# Patient Record
Sex: FEMALE | Race: WHITE | NOT HISPANIC OR LATINO | Employment: STUDENT | URBAN - NONMETROPOLITAN AREA
[De-identification: names, ages, dates, MRNs, and addresses within clinical notes are randomized per-mention and may not be internally consistent; named-entity substitution may affect disease eponyms.]

---
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
## 2025-01-15 ENCOUNTER — OFFICE VISIT (OUTPATIENT)
Dept: FAMILY MEDICINE CLINIC | Age: 20
End: 2025-01-15
Payer: COMMERCIAL

## 2025-01-15 ENCOUNTER — LAB (OUTPATIENT)
Dept: LAB | Facility: HOSPITAL | Age: 20
End: 2025-01-15
Payer: COMMERCIAL

## 2025-01-15 VITALS
DIASTOLIC BLOOD PRESSURE: 65 MMHG | OXYGEN SATURATION: 100 % | WEIGHT: 131 LBS | BODY MASS INDEX: 20.56 KG/M2 | SYSTOLIC BLOOD PRESSURE: 107 MMHG | HEIGHT: 67 IN | TEMPERATURE: 98.4 F | HEART RATE: 90 BPM

## 2025-01-15 DIAGNOSIS — Z13.1 SCREENING FOR DIABETES MELLITUS: ICD-10-CM

## 2025-01-15 DIAGNOSIS — E55.9 VITAMIN D DEFICIENCY: ICD-10-CM

## 2025-01-15 DIAGNOSIS — L70.0 ACNE VULGARIS: ICD-10-CM

## 2025-01-15 DIAGNOSIS — N76.0 ACUTE VAGINITIS: Primary | ICD-10-CM

## 2025-01-15 DIAGNOSIS — R82.998 LEUKOCYTES IN URINE: ICD-10-CM

## 2025-01-15 DIAGNOSIS — E55.9 VITAMIN D DEFICIENCY: Primary | ICD-10-CM

## 2025-01-15 LAB
25(OH)D3 SERPL-MCNC: 13.6 NG/ML (ref 30–100)
ALBUMIN SERPL-MCNC: 4.4 G/DL (ref 3.5–5.2)
ALBUMIN/GLOB SERPL: 1.7 G/DL
ALP SERPL-CCNC: 79 U/L (ref 39–117)
ALT SERPL W P-5'-P-CCNC: 13 U/L (ref 1–33)
ANION GAP SERPL CALCULATED.3IONS-SCNC: 8 MMOL/L (ref 5–15)
AST SERPL-CCNC: 16 U/L (ref 1–32)
BILIRUB BLD-MCNC: NEGATIVE MG/DL
BILIRUB SERPL-MCNC: 0.6 MG/DL (ref 0–1.2)
BUN SERPL-MCNC: 4 MG/DL (ref 6–20)
BUN/CREAT SERPL: 5.7 (ref 7–25)
CALCIUM SPEC-SCNC: 9.3 MG/DL (ref 8.6–10.5)
CANDIDA SPECIES: NEGATIVE
CHLORIDE SERPL-SCNC: 103 MMOL/L (ref 98–107)
CLARITY, POC: CLEAR
CO2 SERPL-SCNC: 28 MMOL/L (ref 22–29)
COLOR UR: YELLOW
CREAT SERPL-MCNC: 0.7 MG/DL (ref 0.57–1)
EGFRCR SERPLBLD CKD-EPI 2021: 128 ML/MIN/1.73
EXPIRATION DATE: ABNORMAL
GARDNERELLA VAGINALIS: NEGATIVE
GLOBULIN UR ELPH-MCNC: 2.6 GM/DL
GLUCOSE SERPL-MCNC: 79 MG/DL (ref 65–99)
GLUCOSE UR STRIP-MCNC: NEGATIVE MG/DL
KETONES UR QL: NEGATIVE
LEUKOCYTE EST, POC: ABNORMAL
Lab: ABNORMAL
NITRITE UR-MCNC: NEGATIVE MG/ML
PH UR: 6 [PH] (ref 5–8)
POTASSIUM SERPL-SCNC: 4.4 MMOL/L (ref 3.5–5.2)
PROT SERPL-MCNC: 7 G/DL (ref 6–8.5)
PROT UR STRIP-MCNC: NEGATIVE MG/DL
RBC # UR STRIP: NEGATIVE /UL
SODIUM SERPL-SCNC: 139 MMOL/L (ref 136–145)
SP GR UR: 1.02 (ref 1–1.03)
T VAGINALIS DNA VAG QL PROBE+SIG AMP: NEGATIVE
UROBILINOGEN UR QL: NORMAL

## 2025-01-15 PROCEDURE — 87086 URINE CULTURE/COLONY COUNT: CPT | Performed by: NURSE PRACTITIONER

## 2025-01-15 PROCEDURE — 87480 CANDIDA DNA DIR PROBE: CPT | Performed by: NURSE PRACTITIONER

## 2025-01-15 PROCEDURE — 80053 COMPREHEN METABOLIC PANEL: CPT

## 2025-01-15 PROCEDURE — 82306 VITAMIN D 25 HYDROXY: CPT

## 2025-01-15 PROCEDURE — 87510 GARDNER VAG DNA DIR PROBE: CPT | Performed by: NURSE PRACTITIONER

## 2025-01-15 PROCEDURE — 87660 TRICHOMONAS VAGIN DIR PROBE: CPT | Performed by: NURSE PRACTITIONER

## 2025-01-15 PROCEDURE — 1159F MED LIST DOCD IN RCRD: CPT | Performed by: NURSE PRACTITIONER

## 2025-01-15 PROCEDURE — 1160F RVW MEDS BY RX/DR IN RCRD: CPT | Performed by: NURSE PRACTITIONER

## 2025-01-15 PROCEDURE — 99214 OFFICE O/P EST MOD 30 MIN: CPT | Performed by: NURSE PRACTITIONER

## 2025-01-15 PROCEDURE — 36415 COLL VENOUS BLD VENIPUNCTURE: CPT

## 2025-01-15 RX ORDER — ACETAMINOPHEN 160 MG
2000 TABLET,DISINTEGRATING ORAL DAILY
Qty: 30 CAPSULE | Refills: 11 | Status: SHIPPED | OUTPATIENT
Start: 2025-01-15 | End: 2025-01-15

## 2025-01-15 RX ORDER — ACETAMINOPHEN 160 MG
2000 TABLET,DISINTEGRATING ORAL DAILY
Qty: 30 CAPSULE | Refills: 11 | Status: SHIPPED | OUTPATIENT
Start: 2025-01-15 | End: 2026-01-15

## 2025-01-15 RX ORDER — CLINDAMYCIN PHOSPHATE 11.9 MG/ML
SOLUTION TOPICAL 2 TIMES DAILY
Qty: 30 ML | Refills: 1 | Status: SHIPPED | OUTPATIENT
Start: 2025-01-15

## 2025-01-15 NOTE — PROGRESS NOTES
"Chief Complaint  Gynecologic Exam    Subjective          Emelia Brooks presents to [facility]     Patient is a 19-year-old female who is here today with her aunt.  Reports a clear vaginal discharge for the last 1 to 2 months with an occasional odor.  Denies itching or burning.  Has never had a Pap smear.  Is sexually active.  Does not want to take birth control pills but uses condoms.  Denies fever, nausea, vomiting, or pelvic pain.  Last menstrual cycle December 28.    Regarding history of vitamin D deficiency, vitamin D level was 11.9 in May.  Stopped taking vitamin D supplement because of concern that could be exacerbating her acne.  She would like a referral to a dermatologist for further treatment of acne.     Objective   Vital Signs:   Vitals:    01/15/25 0810   BP: 107/65   BP Location: Right arm   Patient Position: Sitting   Cuff Size: Adult   Pulse: 90   Temp: 98.4 °F (36.9 °C)   TempSrc: Oral   SpO2: 100%   Weight: 59.4 kg (131 lb)   Height: 170 cm (66.93\")       Wt Readings from Last 3 Encounters:   01/15/25 59.4 kg (131 lb) (57%, Z= 0.17)*   10/16/24 59.5 kg (131 lb 3.2 oz) (58%, Z= 0.21)*   06/24/24 58.5 kg (129 lb) (56%, Z= 0.15)*     * Growth percentiles are based on CDC (Girls, 2-20 Years) data.      BP Readings from Last 3 Encounters:   01/15/25 107/65   10/16/24 94/58   06/24/24 131/83       Body mass index is 20.56 kg/m².    Pediatric BMI = 36 %ile (Z= -0.36) based on CDC (Girls, 2-20 Years) BMI-for-age based on BMI available on 1/15/2025.. BMI is within normal parameters. No other follow-up for BMI required.       Physical Exam  Vitals reviewed.   Constitutional:       General: She is not in acute distress.     Appearance: Normal appearance. She is well-developed.   Cardiovascular:      Rate and Rhythm: Normal rate and regular rhythm.      Heart sounds: Normal heart sounds.   Pulmonary:      Effort: Pulmonary effort is normal.      Breath sounds: Normal breath " sounds.   Genitourinary:     General: Normal vulva.      Vagina: Normal.   Musculoskeletal:      Right lower leg: No edema.      Left lower leg: No edema.   Skin:     General: Skin is warm and dry.   Neurological:      General: No focal deficit present.      Mental Status: She is alert.   Psychiatric:         Attention and Perception: Attention normal.         Mood and Affect: Mood and affect normal.         Behavior: Behavior normal.           Current Outpatient Medications:     albuterol (PROVENTIL) (2.5 MG/3ML) 0.083% nebulizer solution, Take 2.5 mg by nebulization Every 4 (Four) Hours As Needed for Wheezing., Disp: 15 mL, Rfl: 12    albuterol sulfate  (90 Base) MCG/ACT inhaler, Inhale 2 puffs Every 4 (Four) Hours As Needed for Wheezing., Disp: , Rfl:     budesonide-formoterol (Symbicort) 160-4.5 MCG/ACT inhaler, Inhale 2 puffs 2 (Two) Times a Day., Disp: 10.2 g, Rfl: 11    fluticasone (FLONASE) 50 MCG/ACT nasal spray, 1 spray by Each Nare route Daily., Disp: , Rfl:     clindamycin (CLEOCIN T) 1 % external solution, Apply  topically to the appropriate area as directed 2 (Two) Times a Day., Disp: 30 mL, Rfl: 1   Past Medical History:   Diagnosis Date    Bronchitis     Gilbert syndrome 06/13/2024     No Known Allergies            Result Review :     Common labs          5/8/2024    15:27 6/11/2024    10:58 10/16/2024    14:18   Common Labs   Glucose 89   91    BUN 4   4    Creatinine 0.75   0.85    Sodium 138   141    Potassium 4.2   4.5    Chloride 102   107    Calcium 9.5   9.2    Albumin 4.8  4.8  4.3    Total Bilirubin 1.8  1.6  1.1    Alkaline Phosphatase 70  72  78    AST (SGOT) 86  14  19    ALT (SGPT) 45  10  10    WBC 6.03   7.26    Hemoglobin 13.9   14.7    Hematocrit 43.5   45.0    Platelets 322   296         No Images in the past 120 days found..           Social History     Tobacco Use   Smoking Status Never   Smokeless Tobacco Never           Assessment and Plan    Diagnoses and all orders for  this visit:    1. Acute vaginitis (Primary)  -     POCT urinalysis dipstick, automated  -     Gardnerella vaginalis, Trichomonas vaginalis, Candida albicans, DNA - Swab, Vagina; Future  -     Gardnerella vaginalis, Trichomonas vaginalis, Candida albicans, DNA - Swab, Vagina    2. Leukocytes in urine  -     Cancel: Urine Culture - Urine, Urine, Clean Catch; Future  -     Urine Culture - Urine, Urine, Clean Catch    3. Acne vulgaris  -     clindamycin (CLEOCIN T) 1 % external solution; Apply  topically to the appropriate area as directed 2 (Two) Times a Day.  Dispense: 30 mL; Refill: 1  -     Ambulatory Referral to Dermatology    4. Vitamin D deficiency  -     Vitamin D 25 hydroxy; Future    5. Screening for diabetes mellitus  -     Comprehensive metabolic panel; Future        Follow Up    No follow-ups on file.  Patient was given instructions and counseling regarding her condition or for health maintenance advice. Please see specific information pulled into the AVS if appropriate.

## 2025-01-15 NOTE — ASSESSMENT & PLAN NOTE
Not likely a bladder infection but urine culture is pending.  Vaginal screen is also obtained.  Further treatment recommendations pending lab results.  If both of these tests are negative I would then suggest a urine for gonorrhea chlamydia.  Follow-up if symptoms do not resolve.  Start getting Pap smears at age 21 or sooner if concerns.

## 2025-01-16 LAB — BACTERIA SPEC AEROBE CULT: NO GROWTH

## 2025-01-16 NOTE — PROGRESS NOTES
Blood sugar, kidney and liver function are normal.  Vitamin d level is low.  I do recommend trying a different vitamin d supplement which is being sent to pharmacy.  Recheck vitamin d level in 3 months .

## 2025-01-19 DIAGNOSIS — N76.0 ACUTE VAGINITIS: Primary | ICD-10-CM

## 2025-02-05 ENCOUNTER — TELEPHONE (OUTPATIENT)
Dept: FAMILY MEDICINE CLINIC | Age: 20
End: 2025-02-05
Payer: COMMERCIAL

## 2025-02-05 NOTE — TELEPHONE ENCOUNTER
----- Message from Ariana WALL sent at 2/3/2025  1:09 PM EST -----      ----- Message -----  From: SYSTEM  Sent: 2/3/2025   1:22 AM EST  To: [facility]

## 2025-02-06 ENCOUNTER — LAB (OUTPATIENT)
Dept: LAB | Facility: HOSPITAL | Age: 20
End: 2025-02-06
Payer: COMMERCIAL

## 2025-02-06 DIAGNOSIS — N76.0 ACUTE VAGINITIS: ICD-10-CM

## 2025-02-06 LAB
C TRACH RRNA CVX QL NAA+PROBE: NOT DETECTED
N GONORRHOEA RRNA SPEC QL NAA+PROBE: NOT DETECTED

## 2025-02-06 PROCEDURE — 87591 N.GONORRHOEAE DNA AMP PROB: CPT

## 2025-02-06 PROCEDURE — 87491 CHLMYD TRACH DNA AMP PROBE: CPT

## 2025-04-09 ENCOUNTER — LAB (OUTPATIENT)
Dept: LAB | Facility: HOSPITAL | Age: 20
End: 2025-04-09
Payer: COMMERCIAL

## 2025-04-09 DIAGNOSIS — E55.9 VITAMIN D DEFICIENCY: ICD-10-CM

## 2025-04-09 LAB — 25(OH)D3 SERPL-MCNC: 21.1 NG/ML (ref 30–100)

## 2025-04-09 PROCEDURE — 36415 COLL VENOUS BLD VENIPUNCTURE: CPT

## 2025-04-09 PROCEDURE — 82306 VITAMIN D 25 HYDROXY: CPT

## 2025-04-22 ENCOUNTER — OFFICE VISIT (OUTPATIENT)
Dept: PULMONOLOGY | Facility: CLINIC | Age: 20
End: 2025-04-22
Payer: COMMERCIAL

## 2025-04-22 VITALS
SYSTOLIC BLOOD PRESSURE: 116 MMHG | HEIGHT: 67 IN | DIASTOLIC BLOOD PRESSURE: 79 MMHG | RESPIRATION RATE: 14 BRPM | WEIGHT: 143.08 LBS | BODY MASS INDEX: 22.46 KG/M2 | OXYGEN SATURATION: 98 % | TEMPERATURE: 98.4 F | HEART RATE: 104 BPM

## 2025-04-22 DIAGNOSIS — J45.41 MODERATE PERSISTENT ASTHMA WITH EXACERBATION: ICD-10-CM

## 2025-04-22 DIAGNOSIS — R06.02 SHORTNESS OF BREATH: ICD-10-CM

## 2025-04-22 DIAGNOSIS — F17.290 VAPING NICOTINE DEPENDENCE, TOBACCO PRODUCT: Primary | ICD-10-CM

## 2025-04-22 PROCEDURE — 1159F MED LIST DOCD IN RCRD: CPT | Performed by: INTERNAL MEDICINE

## 2025-04-22 PROCEDURE — 1160F RVW MEDS BY RX/DR IN RCRD: CPT | Performed by: INTERNAL MEDICINE

## 2025-04-22 PROCEDURE — 99214 OFFICE O/P EST MOD 30 MIN: CPT | Performed by: INTERNAL MEDICINE

## 2025-04-22 NOTE — PROGRESS NOTES
Primary Care Provider  Danette Regan APRN     Referring Provider  No ref. provider found     Chief Complaint  Follow-up (10 month) and Cough    Subjective          Emelia Brooks presents to [facility] PULMONARY & CRITICAL CARE MEDICINE  History of Present Illness  Emelia Brooks is a 19 y.o. female patient   History of Present Illness  The patient is a 19-year-old female with a history of spontaneous pneumothorax, pneumomediastinum, and asthma, here for a follow-up visit.    She reports an improvement in her condition. She continues to vape at the same frequency as before but does not use any other tobacco products. She has discontinued the use of Singulair and another unidentified medication, as well as her inhalers, due to perceived worsening of symptoms. She experiences chest pain during prolonged walking or running. She maintains oral hygiene by rinsing her mouth after using Symbicort to prevent thrush. She has recently started taking zinc supplements to boost her immune system. She occasionally experiences acid reflux and postnasal drip. During episodes of pneumonia and asthma, she has experienced significant coughing to expel phlegm. She has not required the use of rescue inhalers for several months. She is not currently using any inhalers but has Symbicort available for use as needed. She was previously on a regimen of Symbicort, two puffs twice daily, but discontinued it due to feeling worse.    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 She resides with her father, who smokes, and they have dogs at home. She is uncertain about the date of her last influenza vaccination.    SOCIAL HISTORY  She admits to vaping but does not smoke cigarettes. 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 She resides with her father, who smokes, and they have dogs at home.    FAMILY HISTORY  She reports no family history of  asthma.    MEDICATIONS  Discontinued: Singulair    Review of Systems     General:  No Fatigue, No Fever No weight loss or loss of appetite  HEENT: No dysphagia, No Visual Changes, no rhinorrhea  Respiratory:  + cough,+Dyspnea, intermittent phlegm, No Pleuritic Pain, no wheezing, no hemoptysis.  Cardiovascular: Denies chest pain, denies palpitations,+STUART, No Chest Pressure  Gastrointestinal:  No Abdominal Pain, No Nausea, No Vomiting, No Diarrhea  Genitourinary:  No Dysuria, No Frequency, No Hesitancy  Musculoskeletal: No muscle pain or swelling  Endocrine:  No Heat Intolerance, No Cold Intolerance  Hematologic:  No Bleeding, No Bruising  Psychiatric:  No Anxiety, No Depression  Neurologic:  No Confusion, no Dysarthria, No Headaches  Skin:  No Rash, No Open Wounds    Family History   Problem Relation Age of Onset    Heart attack Father     Hypertension Father     Liver disease Brother     Hepatitis Brother         autoimmune cause        Social History     Socioeconomic History    Marital status: Single   Tobacco Use    Smoking status: Never    Smokeless tobacco: Never   Vaping Use    Vaping status: Every Day    Substances: Nicotine, Flavoring    Devices: Disposable   Substance and Sexual Activity    Alcohol use: Never    Drug use: Not Currently     Types: Marijuana    Sexual activity: Defer        Past Medical History:   Diagnosis Date    Bronchitis     Gilbert syndrome 06/13/2024        Immunization History   Administered Date(s) Administered    DTaP, Unspecified 2005, 01/13/2006, 03/06/2006, 04/14/2008, 12/14/2010    Flu Vaccine Split Quad 11/08/2019    Fluzone (or Fluarix & Flulaval for VFC) >6mos 11/08/2019    Hep A, 2 Dose 04/14/2008, 12/14/2010    Hep B, Adolescent or Pediatric 2005, 2005, 06/26/2006    HiB 2005, 01/13/2006, 03/06/2006    IPV 2005, 01/13/2006, 04/14/2008, 12/14/2010    MMR 09/18/2006    MMRV 12/14/2010    Meningococcal Conjugate 11/12/2021    Meningococcal MCV4P  "(Menactra) 12/06/2016    PEDS-Pneumococcal Conjugate (PCV7) 2005, 01/13/2006, 03/06/2006, 09/18/2006    Tdap 12/06/2016    Varicella 09/18/2006         No Known Allergies       Current Outpatient Medications:     clindamycin (CLEOCIN T) 1 % external solution, Apply  topically to the appropriate area as directed 2 (Two) Times a Day., Disp: 30 mL, Rfl: 1    albuterol (PROVENTIL) (2.5 MG/3ML) 0.083% nebulizer solution, Take 2.5 mg by nebulization Every 4 (Four) Hours As Needed for Wheezing. (Patient not taking: Reported on 4/22/2025), Disp: 15 mL, Rfl: 12    albuterol sulfate  (90 Base) MCG/ACT inhaler, Inhale 2 puffs Every 4 (Four) Hours As Needed for Wheezing. (Patient not taking: Reported on 4/22/2025), Disp: , Rfl:     budesonide-formoterol (Symbicort) 160-4.5 MCG/ACT inhaler, Inhale 2 puffs 2 (Two) Times a Day. (Patient not taking: Reported on 4/22/2025), Disp: 10.2 g, Rfl: 11    Cholecalciferol (Vitamin D3) 50 MCG (2000 UT) capsule, Take 1 capsule by mouth Daily. (Patient not taking: Reported on 4/22/2025), Disp: 30 capsule, Rfl: 11    fluticasone (FLONASE) 50 MCG/ACT nasal spray, 1 spray by Each Nare route Daily. (Patient not taking: Reported on 4/22/2025), Disp: , Rfl:      Objective   Physical Exam  Physical Exam  Lungs are clear.    Vital Signs:   /79 (BP Location: Right arm, Patient Position: Sitting, Cuff Size: Adult)   Pulse 104   Temp 98.4 °F (36.9 °C) (Tympanic)   Resp 14   Ht 170 cm (66.93\")   Wt 64.9 kg (143 lb 1.3 oz)   SpO2 98% Comment: room air  BMI 22.46 kg/m²       Vital Signs Reviewed  WDWN, Alert, NAD.   HEENT:  PERRL, EOMI.  OP, nares clear, no sinus tenderness  Mallampatti classification : 1  Neck:  Supple, no JVD, no thyromegaly  Lymph: no axillary, cervical, supraclavicular lymphadenopathy noted bilaterally  Chest:  good aeration, bilateral diminished breath sounds, no wheezing, crackles or rhonchi, resonant to percussion b/l  CV: RRR, no MGR, pulses 2+, " equal.  Abd:  Soft, NT, ND, + BS, no HSM  EXT:  no clubbing, no cyanosis, No BLE edema  Neuro:  A&Ox3, CN grossly intact, no focal deficits.  Skin: No rashes or lesions noted     Result Review :   The following data was reviewed by: Scott Tan MD on 04/22/2025:  Common labs          6/11/2024    10:58 10/16/2024    14:18 1/15/2025    08:49   Common Labs   Glucose  91  79    BUN  4  4    Creatinine  0.85  0.70    Sodium  141  139    Potassium  4.5  4.4    Chloride  107  103    Calcium  9.2  9.3    Albumin 4.8  4.3  4.4    Total Bilirubin 1.6  1.1  0.6    Alkaline Phosphatase 72  78  79    AST (SGOT) 14  19  16    ALT (SGPT) 10  10  13    WBC  7.26     Hemoglobin  14.7     Hematocrit  45.0     Platelets  296       CMP          6/11/2024    10:58 10/16/2024    14:18 1/15/2025    08:49   CMP   Glucose  91  79    BUN  4  4    Creatinine  0.85  0.70    EGFR  101.4  128.0    Sodium  141  139    Potassium  4.5  4.4    Chloride  107  103    Calcium  9.2  9.3    Total Protein 7.3  7.1  7.0    Albumin 4.8  4.3  4.4    Globulin  2.8  2.6    Total Bilirubin 1.6  1.1  0.6    Alkaline Phosphatase 72  78  79    AST (SGOT) 14  19  16    ALT (SGPT) 10  10  13    Albumin/Globulin Ratio  1.5  1.7    BUN/Creatinine Ratio  4.7  5.7    Anion Gap  11.4  8.0      CBC          5/8/2024    15:27 10/16/2024    14:18   CBC   WBC 6.03  7.26    RBC 4.57  4.82    Hemoglobin 13.9  14.7    Hematocrit 43.5  45.0    MCV 95.2  93.4    MCH 30.4  30.5    MCHC 32.0  32.7    RDW 11.7  11.8    Platelets 322  296      CBC w/diff          5/8/2024    15:27 10/16/2024    14:18   CBC w/Diff   WBC 6.03  7.26    RBC 4.57  4.82    Hemoglobin 13.9  14.7    Hematocrit 43.5  45.0    MCV 95.2  93.4    MCH 30.4  30.5    MCHC 32.0  32.7    RDW 11.7  11.8    Platelets 322  296    Neutrophil Rel % 56.5  60.9    Immature Granulocyte Rel % 0.2  0.1    Lymphocyte Rel % 33.7  26.0    Monocyte Rel % 7.1  7.9    Eosinophil Rel % 2.2  4.8    Basophil Rel % 0.3  0.3       Data reviewed : Radiologic studies previous CT chest from 2024 reviewed.     Results  Imaging  CAT scan from last year was normal.    Testing  Methacholine challenge test conducted 2 years ago confirmed the presence of asthma.        Narrative & Impression   CT CHEST WO CONTRAST DIAGNOSTIC-     Date of Exam: 4/30/2024 9:56 AM     Indication: pneumomedistinum; J98.2-Interstitial emphysema;  J45.41-Moderate persistent asthma with (acute) exacerbation;  F17.290-Nicotine dependence, other tobacco product, uncomplicated.     Comparison: 11/28/2023     Technique: Axial CT images were obtained of the chest without contrast  administration.  Reconstructed coronal and sagittal images were also  obtained. Automated exposure control and iterative construction methods  were used.        Findings:  Torrie/mediastinum: No pneumomediastinum. No abnormal mediastinal fluid  collection. Normal-appearing remnant thymic tissue in the anterior  mediastinum. No pericardial effusion. No coronary calcification     Lungs/pleura: Lungs clear. Interval resolution of previously  demonstrated groundglass infiltrates. Pleural spaces clear     Upper abdomen: Unremarkable     Bones/soft tissues: No bony abnormality     IMPRESSION:  Impression:  Unremarkable exam. Interval resolution of pneumomediastinum and  pulmonary groundglass infiltrates           Electronically Signed By-Jai Workman On:4/30/2024 11:43 AM           Assessment and Plan    Diagnoses and all orders for this visit:    1. Vaping nicotine dependence, tobacco product (Primary)    2. Shortness of breath    3. Moderate persistent asthma with exacerbation      Assessment & Plan  1. Asthma.  Her asthma is currently well-managed, with no recent need for rescue inhalers. She has a history of spontaneous pneumothorax and pneumomediastinum, which may be exacerbated by viral infections such as influenza, RSV, COVID-19, and rhinovirus. 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 She also exhibits exercise-induced asthma, experiencing chest pain during prolonged walking or running. She was advised to avoid potential asthma triggers such as dust and chemicals, and to discontinue vaping. She was also advised to receive the influenza vaccine in either September or October 2025. She was instructed to use albuterol as needed, particularly during episodes of bronchitis. If the frequency of albuterol use exceeds three times per week, she should commence a scheduled maintenance inhaler regimen with Symbicort. She was also advised to use the inhaler 10 to 15 minutes prior to engaging in heavy physical activities. If her condition deteriorates, she can revert to her previous medication regimen.    Follow-up  The patient will follow up in 9 to 12 months.    Follow Up   No follow-ups on file.  Patient was given instructions and counseling regarding her condition or for health maintenance advice. Please see specific information pulled into the AVS if appropriate.     Patient or patient representative verbalized consent for the use of Ambient Listening during the visit with  Scott Tan MD for chart documentation. 4/22/2025  12:51 EDT    Electronically signed by Scott Tan MD, 4/22/2025, 12:48 EDT.

## 2025-04-23 DIAGNOSIS — L70.0 ACNE VULGARIS: ICD-10-CM

## 2025-04-24 RX ORDER — CLINDAMYCIN PHOSPHATE 11.9 MG/ML
SOLUTION TOPICAL
Qty: 60 ML | Refills: 2 | Status: SHIPPED | OUTPATIENT
Start: 2025-04-24

## 2025-06-17 ENCOUNTER — OFFICE VISIT (OUTPATIENT)
Dept: FAMILY MEDICINE CLINIC | Age: 20
End: 2025-06-17
Payer: COMMERCIAL

## 2025-06-17 ENCOUNTER — LAB (OUTPATIENT)
Dept: LAB | Facility: HOSPITAL | Age: 20
End: 2025-06-17
Payer: COMMERCIAL

## 2025-06-17 VITALS
DIASTOLIC BLOOD PRESSURE: 70 MMHG | HEIGHT: 67 IN | BODY MASS INDEX: 22.76 KG/M2 | SYSTOLIC BLOOD PRESSURE: 119 MMHG | HEART RATE: 80 BPM | WEIGHT: 145 LBS | TEMPERATURE: 98.4 F | OXYGEN SATURATION: 100 %

## 2025-06-17 DIAGNOSIS — R53.83 OTHER FATIGUE: ICD-10-CM

## 2025-06-17 DIAGNOSIS — R21 RASH: ICD-10-CM

## 2025-06-17 DIAGNOSIS — E55.9 VITAMIN D DEFICIENCY: ICD-10-CM

## 2025-06-17 DIAGNOSIS — T63.301D SPIDER BITE WOUND, ACCIDENTAL OR UNINTENTIONAL, SUBSEQUENT ENCOUNTER: ICD-10-CM

## 2025-06-17 PROBLEM — T63.301A SPIDER BITE: Status: ACTIVE | Noted: 2025-06-17

## 2025-06-17 LAB
25(OH)D3 SERPL-MCNC: 17 NG/ML (ref 30–100)
ALBUMIN SERPL-MCNC: 4.3 G/DL (ref 3.5–5.2)
ALBUMIN/GLOB SERPL: 1.7 G/DL
ALP SERPL-CCNC: 103 U/L (ref 39–117)
ALT SERPL W P-5'-P-CCNC: 15 U/L (ref 1–33)
ANION GAP SERPL CALCULATED.3IONS-SCNC: 9.6 MMOL/L (ref 5–15)
APTT PPP: 29.2 SECONDS (ref 24.2–34.2)
AST SERPL-CCNC: 20 U/L (ref 1–32)
BACTERIA UR QL AUTO: ABNORMAL /HPF
BASOPHILS # BLD AUTO: 0.02 10*3/MM3 (ref 0–0.2)
BASOPHILS NFR BLD AUTO: 0.2 % (ref 0–1.5)
BILIRUB SERPL-MCNC: 2.4 MG/DL (ref 0–1.2)
BILIRUB UR QL STRIP: NEGATIVE
BUN SERPL-MCNC: 6 MG/DL (ref 6–20)
BUN/CREAT SERPL: 8.6 (ref 7–25)
CALCIUM SPEC-SCNC: 8.9 MG/DL (ref 8.6–10.5)
CHLORIDE SERPL-SCNC: 103 MMOL/L (ref 98–107)
CK SERPL-CCNC: 36 U/L
CLARITY UR: CLEAR
CO2 SERPL-SCNC: 25.4 MMOL/L (ref 22–29)
COLOR UR: ABNORMAL
CREAT SERPL-MCNC: 0.7 MG/DL (ref 0.57–1)
CRP SERPL-MCNC: 0.59 MG/DL (ref 0–0.5)
DEPRECATED RDW RBC AUTO: 41.3 FL (ref 37–54)
EGFRCR SERPLBLD CKD-EPI 2021: 128 ML/MIN/1.73
EOSINOPHIL # BLD AUTO: 1 10*3/MM3 (ref 0–0.4)
EOSINOPHIL NFR BLD AUTO: 11.9 % (ref 0.3–6.2)
ERYTHROCYTE [DISTWIDTH] IN BLOOD BY AUTOMATED COUNT: 12 % (ref 12.3–15.4)
EXPIRATION DATE: NORMAL
GLOBULIN UR ELPH-MCNC: 2.6 GM/DL
GLUCOSE SERPL-MCNC: 84 MG/DL (ref 65–99)
GLUCOSE UR STRIP-MCNC: NEGATIVE MG/DL
HCT VFR BLD AUTO: 39.3 % (ref 34–46.6)
HGB BLD-MCNC: 12.6 G/DL (ref 12–15.9)
HGB UR QL STRIP.AUTO: NEGATIVE
IMM GRANULOCYTES # BLD AUTO: 0.02 10*3/MM3 (ref 0–0.05)
IMM GRANULOCYTES NFR BLD AUTO: 0.2 % (ref 0–0.5)
INTERNAL CONTROL: NORMAL
KETONES UR QL STRIP: NEGATIVE
LEUKOCYTE ESTERASE UR QL STRIP.AUTO: ABNORMAL
LYMPHOCYTES # BLD AUTO: 2.36 10*3/MM3 (ref 0.7–3.1)
LYMPHOCYTES NFR BLD AUTO: 28 % (ref 19.6–45.3)
Lab: NORMAL
MCH RBC QN AUTO: 30.2 PG (ref 26.6–33)
MCHC RBC AUTO-ENTMCNC: 32.1 G/DL (ref 31.5–35.7)
MCV RBC AUTO: 94.2 FL (ref 79–97)
MONOCYTES # BLD AUTO: 0.56 10*3/MM3 (ref 0.1–0.9)
MONOCYTES NFR BLD AUTO: 6.7 % (ref 5–12)
NEUTROPHILS NFR BLD AUTO: 4.46 10*3/MM3 (ref 1.7–7)
NEUTROPHILS NFR BLD AUTO: 53 % (ref 42.7–76)
NITRITE UR QL STRIP: NEGATIVE
PH UR STRIP.AUTO: 7 [PH] (ref 5–8)
PLATELET # BLD AUTO: 291 10*3/MM3 (ref 140–450)
PMV BLD AUTO: 8.5 FL (ref 6–12)
POTASSIUM SERPL-SCNC: 4.3 MMOL/L (ref 3.5–5.2)
PROT SERPL-MCNC: 6.9 G/DL (ref 6–8.5)
PROT UR QL STRIP: NEGATIVE
RBC # BLD AUTO: 4.17 10*6/MM3 (ref 3.77–5.28)
RBC # UR STRIP: ABNORMAL /HPF
REF LAB TEST METHOD: ABNORMAL
RETICS # AUTO: 0.09 10*6/MM3 (ref 0.02–0.13)
RETICS/RBC NFR AUTO: 2.04 % (ref 0.7–1.9)
S PYO AG THROAT QL: NEGATIVE
SODIUM SERPL-SCNC: 138 MMOL/L (ref 136–145)
SP GR UR STRIP: 1.02 (ref 1–1.03)
SQUAMOUS #/AREA URNS HPF: ABNORMAL /HPF
UROBILINOGEN UR QL STRIP: ABNORMAL
VIT B12 BLD-MCNC: 252 PG/ML (ref 211–946)
WBC # UR STRIP: ABNORMAL /HPF
WBC NRBC COR # BLD AUTO: 8.42 10*3/MM3 (ref 3.4–10.8)

## 2025-06-17 PROCEDURE — 86140 C-REACTIVE PROTEIN: CPT

## 2025-06-17 PROCEDURE — 85045 AUTOMATED RETICULOCYTE COUNT: CPT

## 2025-06-17 PROCEDURE — 81001 URINALYSIS AUTO W/SCOPE: CPT

## 2025-06-17 PROCEDURE — 85730 THROMBOPLASTIN TIME PARTIAL: CPT

## 2025-06-17 PROCEDURE — 85025 COMPLETE CBC W/AUTO DIFF WBC: CPT

## 2025-06-17 PROCEDURE — 80053 COMPREHEN METABOLIC PANEL: CPT

## 2025-06-17 PROCEDURE — 82306 VITAMIN D 25 HYDROXY: CPT

## 2025-06-17 PROCEDURE — 82550 ASSAY OF CK (CPK): CPT

## 2025-06-17 PROCEDURE — 87081 CULTURE SCREEN ONLY: CPT | Performed by: NURSE PRACTITIONER

## 2025-06-17 PROCEDURE — 82607 VITAMIN B-12: CPT

## 2025-06-17 PROCEDURE — 36415 COLL VENOUS BLD VENIPUNCTURE: CPT

## 2025-06-17 RX ORDER — CETIRIZINE HYDROCHLORIDE 10 MG/1
1 TABLET ORAL DAILY
COMMUNITY
Start: 2025-06-13

## 2025-06-17 RX ORDER — PREDNISONE 20 MG/1
1 TABLET ORAL EVERY 12 HOURS SCHEDULED
COMMUNITY
Start: 2025-06-13

## 2025-06-17 RX ORDER — DOXYCYCLINE 100 MG/1
100 CAPSULE ORAL 2 TIMES DAILY
Qty: 20 CAPSULE | Refills: 0 | Status: SHIPPED | OUTPATIENT
Start: 2025-06-17

## 2025-06-17 RX ORDER — IBUPROFEN 800 MG/1
TABLET, FILM COATED ORAL
COMMUNITY
Start: 2025-06-13

## 2025-06-17 NOTE — PROGRESS NOTES
"Chief Complaint  Transitional Care Management ([facility] ER f/u from 6/12 -6/13 for Insect bite of right back wall of thorax)    Subjective          Emelia Al Faizan presents to [facility]     Patient is a 19-year-old female who is here today regarding a bite she received on her back June 12.  Has video footage of the spider from before her killing the spider and feels as if it was a brown recluse spider.  [facility] and was prescribed prednisone, ibuprofen, Zyrtec and advised to go to the emergency room for antivenom.  She went to [facility] emergency room and was told no antivenom existed and there was no need for to take antibiotics as there was no infection.  Patient did not start medications from urgent care but did notice some puffiness at the site of the bite that has since resolved.  She did have a temperature of 100.3 on Saturday and is started with a diffuse itchy rash.  She did have some nausea initially with no vomiting and denies muscle pain.  Her urine did look dark on Saturday but none since that time.  Last menstrual cycle was about 3 weeks ago.  She feels as if the area is looking better.     Objective   Vital Signs:   Vitals:    06/17/25 1039   BP: 119/70   BP Location: Right arm   Patient Position: Sitting   Cuff Size: Adult   Pulse: 80   Temp: 98.4 °F (36.9 °C)   TempSrc: Temporal   SpO2: 100%   Weight: 65.8 kg (145 lb)   Height: 170 cm (66.93\")       Wt Readings from Last 3 Encounters:   06/17/25 65.8 kg (145 lb) (75%, Z= 0.68)*   04/22/25 64.9 kg (143 lb 1.3 oz) (73%, Z= 0.63)*   01/15/25 59.4 kg (131 lb) (57%, Z= 0.17)*     * Growth percentiles are based on CDC (Girls, 2-20 Years) data.      BP Readings from Last 3 Encounters:   06/17/25 119/70   04/22/25 116/79   01/15/25 107/65       Body mass index is 22.76 kg/m².    Pediatric BMI = 62 %ile (Z= 0.30) based on CDC (Girls, 2-20 Years) BMI-for-age based on BMI available on 6/17/2025.. BMI is within normal " parameters. No other follow-up for BMI required.       Physical Exam  Vitals reviewed.   Constitutional:       General: She is not in acute distress.     Appearance: Normal appearance. She is well-developed.   HENT:      Mouth/Throat:      Pharynx: No oropharyngeal exudate or posterior oropharyngeal erythema.   Cardiovascular:      Rate and Rhythm: Normal rate and regular rhythm.      Heart sounds: Normal heart sounds.   Pulmonary:      Effort: Pulmonary effort is normal.      Breath sounds: Normal breath sounds.   Musculoskeletal:      Right lower leg: No edema.      Left lower leg: No edema.   Skin:     General: Skin is warm and dry.      Comments: Diffuse raised pinpoint rash on trunk and upper thighs  See photo under media for appearance of suspected spider bite   Neurological:      General: No focal deficit present.      Mental Status: She is alert.   Psychiatric:         Attention and Perception: Attention normal.         Mood and Affect: Mood and affect normal.         Behavior: Behavior normal.           Current Outpatient Medications:     albuterol (PROVENTIL) (2.5 MG/3ML) 0.083% nebulizer solution, Take 2.5 mg by nebulization Every 4 (Four) Hours As Needed for Wheezing., Disp: 15 mL, Rfl: 12    albuterol sulfate  (90 Base) MCG/ACT inhaler, Inhale 2 puffs Every 4 (Four) Hours As Needed for Wheezing., Disp: , Rfl:     budesonide-formoterol (Symbicort) 160-4.5 MCG/ACT inhaler, Inhale 2 puffs 2 (Two) Times a Day., Disp: 10.2 g, Rfl: 11    clindamycin (CLEOCIN T) 1 % external solution, APPLY  SOLUTION TOPICALLY TO AFFECTED AREA TWICE DAILY, Disp: 60 mL, Rfl: 2    fluticasone (FLONASE) 50 MCG/ACT nasal spray, 1 spray by Each Nare route Daily., Disp: , Rfl:     ibuprofen (ADVIL,MOTRIN) 800 MG tablet, TAKE 1 TABLET BY MOUTH 4 TIMES DAILY AS NEEDED FOR PAIN FOR 7 DAYS, Disp: , Rfl:     predniSONE (DELTASONE) 20 MG tablet, Take 1 tablet by mouth Every 12 (Twelve) Hours., Disp: , Rfl:     cetirizine (zyrTEC)  10 MG tablet, Take 1 tablet by mouth Daily., Disp: , Rfl:     Cholecalciferol (Vitamin D3) 50 MCG (2000 UT) capsule, Take 1 capsule by mouth Daily. (Patient not taking: Reported on 4/22/2025), Disp: 30 capsule, Rfl: 11    doxycycline (MONODOX) 100 MG capsule, Take 1 capsule by mouth 2 (Two) Times a Day., Disp: 20 capsule, Rfl: 0    vitamin D (ERGOCALCIFEROL) 1.25 MG (95469 UT) capsule capsule, Take 1 capsule by mouth 1 (One) Time Per Week., Disp: 10 capsule, Rfl: 0   Past Medical History:   Diagnosis Date    Bronchitis     Gilbert syndrome 06/13/2024     No Known Allergies            Result Review :     Common labs          10/16/2024    14:18 1/15/2025    08:49 6/17/2025    11:31   Common Labs   Glucose 91  79  84    BUN 4  4  6.0    Creatinine 0.85  0.70  0.70    Sodium 141  139  138    Potassium 4.5  4.4  4.3    Chloride 107  103  103    Calcium 9.2  9.3  8.9    Albumin 4.3  4.4  4.3    Total Bilirubin 1.1  0.6  2.4    Alkaline Phosphatase 78  79  103    AST (SGOT) 19  16  20    ALT (SGPT) 10  13  15    WBC 7.26   8.42    Hemoglobin 14.7   12.6    Hematocrit 45.0   39.3    Platelets 296   291         No Images in the past 120 days found..           Social History     Tobacco Use   Smoking Status Never   Smokeless Tobacco Never           Assessment and Plan    Diagnoses and all orders for this visit:    1. Spider bite wound, accidental or unintentional, subsequent encounter  Assessment & Plan:  I recommend that patient take the prednisone that she received from urgent care and will cover for potential infection with doxycycline.  Doxycycline will increase sun sensitivity so wear sunscreen and take medication with food to avoid stomach upset.  Further treatment recommendations pending lab results.  Will rule out any other systemic consequence of potential spider bite.  To the emergency room if any worsening of symptoms.    Orders:  -     CK; Future  -     CBC w AUTO Differential; Future  -     C-reactive protein;  Future  -     Reticulocytes; Future  -     Comprehensive metabolic panel; Future  -     Urinalysis With Microscopic - Urine, Clean Catch; Future  -     APTT; Future  -     doxycycline (MONODOX) 100 MG capsule; Take 1 capsule by mouth 2 (Two) Times a Day.  Dispense: 20 capsule; Refill: 0    2. Vitamin D deficiency  -     Vitamin D 25 hydroxy; Future    3. Other fatigue  -     Vitamin B12; Future    4. Rash  Assessment & Plan:  Throat on exam is nonconcerning for streptococcal infection but considering her rash I would like to rule it out and patient is agreeable.    Orders:  -     POCT rapid strep A  -     Beta Strep Culture, Throat - , Throat; Future  -     Beta Strep Culture, Throat - Swab, Throat        Follow Up    No follow-ups on file.  Patient was given instructions and counseling regarding her condition or for health maintenance advice. Please see specific information pulled into the AVS if appropriate.

## 2025-06-18 DIAGNOSIS — E55.9 VITAMIN D DEFICIENCY: Primary | ICD-10-CM

## 2025-06-18 RX ORDER — ERGOCALCIFEROL 1.25 MG/1
50000 CAPSULE, LIQUID FILLED ORAL WEEKLY
Qty: 10 CAPSULE | Refills: 0 | Status: SHIPPED | OUTPATIENT
Start: 2025-06-18

## 2025-06-18 NOTE — ASSESSMENT & PLAN NOTE
Throat on exam is nonconcerning for streptococcal infection but considering her rash I would like to rule it out and patient is agreeable.

## 2025-06-18 NOTE — ASSESSMENT & PLAN NOTE
I recommend that patient take the prednisone that she received from urgent care and will cover for potential infection with doxycycline.  Doxycycline will increase sun sensitivity so wear sunscreen and take medication with food to avoid stomach upset.  Further treatment recommendations pending lab results.  Will rule out any other systemic consequence of potential spider bite.  To the emergency room if any worsening of symptoms.

## 2025-06-19 LAB — BACTERIA SPEC AEROBE CULT: NORMAL

## 2025-08-23 DIAGNOSIS — E55.9 VITAMIN D DEFICIENCY: ICD-10-CM

## 2025-08-26 DIAGNOSIS — E55.9 VITAMIN D DEFICIENCY: ICD-10-CM

## 2025-08-26 RX ORDER — ERGOCALCIFEROL 1.25 MG/1
50000 CAPSULE, LIQUID FILLED ORAL WEEKLY
Qty: 10 CAPSULE | Refills: 0 | OUTPATIENT
Start: 2025-08-26

## 2025-08-26 RX ORDER — ACETAMINOPHEN 160 MG
2000 TABLET,DISINTEGRATING ORAL DAILY
Qty: 30 CAPSULE | Refills: 11 | Status: SHIPPED | OUTPATIENT
Start: 2025-08-26 | End: 2026-08-26